# Patient Record
Sex: MALE | Race: WHITE | Employment: OTHER | URBAN - METROPOLITAN AREA
[De-identification: names, ages, dates, MRNs, and addresses within clinical notes are randomized per-mention and may not be internally consistent; named-entity substitution may affect disease eponyms.]

---

## 2023-07-13 ENCOUNTER — OFFICE VISIT (OUTPATIENT)
Dept: URGENT CARE | Facility: URGENT CARE | Age: 83
End: 2023-07-13
Payer: COMMERCIAL

## 2023-07-13 VITALS
OXYGEN SATURATION: 98 % | DIASTOLIC BLOOD PRESSURE: 78 MMHG | WEIGHT: 171 LBS | HEART RATE: 80 BPM | RESPIRATION RATE: 12 BRPM | SYSTOLIC BLOOD PRESSURE: 162 MMHG | TEMPERATURE: 97.8 F

## 2023-07-13 DIAGNOSIS — A69.20 ERYTHEMA MIGRANS (LYME DISEASE): Primary | ICD-10-CM

## 2023-07-13 PROCEDURE — 99203 OFFICE O/P NEW LOW 30 MIN: CPT

## 2023-07-13 RX ORDER — DOXYCYCLINE 100 MG/1
100 CAPSULE ORAL 2 TIMES DAILY
Qty: 20 CAPSULE | Refills: 0 | Status: SHIPPED | OUTPATIENT
Start: 2023-07-13 | End: 2023-07-23

## 2023-07-13 RX ORDER — ALENDRONATE SODIUM 35 MG/1
35 TABLET ORAL
COMMUNITY

## 2023-07-13 RX ORDER — PREGABALIN 50 MG/1
50 CAPSULE ORAL 3 TIMES DAILY
COMMUNITY

## 2023-07-13 RX ORDER — INDOMETHACIN 50 MG/1
50 CAPSULE ORAL 2 TIMES DAILY WITH MEALS
COMMUNITY

## 2023-07-14 NOTE — PATIENT INSTRUCTIONS
Take the antibiotic as prescribed and finish the full course even if symptoms improve.  Try yogurt with active cultures or probiotics such as Culturelle daily to help prevent diarrhea while using antibiotics.  Check Lyme titer in 7 days.  Monitor for symptoms of worsening rash or flu like illness for 30 days.     Tick bite prevention:  Walk in the center of trails.  Wear protective clothing.  Insect repellent-   DEET, picaridin, para-methane-diol PMD, oil of lemon eucalyptus OLE  Not to be used on babies <2 months.  No PMD or OLE if <3 years old  Treat clothing with permethrin.  Check your pets, clothing, gear and body after returning home.    Washing clothes in hot water or tumble drying on high heat for 10 minutes will kill ticks.    Follow up with your primary care provider to discuss your elevated blood pressure.

## 2023-07-14 NOTE — PROGRESS NOTES
ASSESSMENT:  (A69.20) Erythema migrans (Lyme disease)  (primary encounter diagnosis)  Plan: doxycycline hyclate (VIBRAMYCIN) 100 MG capsule    PLAN:  Tick bite antibiotic treatment patient instructions discussed and provided.  Informed the patient to take the antibiotic as prescribed and finish the full course even if symptoms improve.  We discussed trying yogurt with active cultures or probiotic such as Culturelle daily to help prevent diarrhea while taking the antibiotic.  Instructed the patient to have a Lyme titer checked within 7 days.  We also discussed the need for him to monitor for symptoms of worsening rash or flulike illness for 30 days.  The following tick bite prevention information was included for the patient to reference:  Walk in the center of trails.  Wear protective clothing.  Insect repellent-   DEET, picaridin, para-methane-diol PMD, oil of lemon eucalyptus OLE  Not to be used on babies <2 months.  No PMD or OLE if <3 years old  Treat clothing with permethrin.  Check your pets, clothing, gear and body after returning home.    Washing clothes in hot water or tumble drying on high heat for 10 minutes will kill ticks.  The patient was also instructed to follow-up with his primary care provider to discuss his elevated blood pressure.  Patient acknowledged his understanding of the above plan.    The use of Dragon/DocuSpeak dictation services may have been used to construct the content in this note; any grammatical or spelling errors are non-intentional. Please contact the author of this note directly if you are in need of any clarification.      JASMINE Anguiano CNP      SUBJECTIVE:  Andrew Melo is a 83 year old male who presents with a tick bite. Removed tick on Sunday night.  He is unsure how long the tick was on his body prior to removal.  No difficulty with removal and no tick parts remained.  Area around bite has been red, itching and swollen.  Patient denies fever, chills, joint or  muscle aches or other areas or rash.    ROS:   Negative except noted above.    OBJECTIVE:  BP (!) 196/75   Pulse 80   Temp 97.8  F (36.6  C) (Oral)   Resp 12   Wt 77.6 kg (171 lb)   SpO2 98%   GENERAL APPEARANCE: healthy, alert and no distress  SKIN: 10cm in diameter area of erythema with an ulceration noted near the center.  This 10 cm in diameter area of erythema is located on the left chest wall midclavicular line below the nipple.